# Patient Record
(demographics unavailable — no encounter records)

---

## 2024-12-16 NOTE — PHYSICAL EXAM
[No Acute Distress] : no acute distress [Normal Appearance] : normal appearance [Supple] : supple [Thyroid Not Enlarged] : thyroid not enlarged [No JVD] : no jvd [Murmur ___ / 6] : murmur [unfilled] / 6 [Normal S1, S2] : normal s1, s2 [Normal to Percussion] : normal to percussion [No Abnormalities] : no abnormalities [Benign] : benign [Not Tender] : not tender [No HSM] : no hsm [No Clubbing] : no clubbing [No Cyanosis] : no cyanosis [No Focal Deficits] : no focal deficits [Oriented x3] : oriented x3 [General Appearance - Well Developed] : well developed [General Appearance - Well Nourished] : well nourished [Neck Appearance] : the appearance of the neck was normal [Neck Cervical Mass (___cm)] : no neck mass was observed [Jugular Venous Distention Increased] : there was no jugular-venous distention [Thyroid Diffuse Enlargement] : the thyroid was not enlarged [Thyroid Nodule] : there were no palpable thyroid nodules [Heart Rate And Rhythm] : heart rate and rhythm were normal [Heart Sounds] : normal S1 and S2 [Abnormal Walk] : normal gait [Nail Clubbing] : no clubbing of the fingernails [Cyanosis, Localized] : no localized cyanosis [Skin Color & Pigmentation] : normal skin color and pigmentation [] : no rash [No Venous Stasis] : no venous stasis [Skin Lesions] : no skin lesions [No Skin Ulcers] : no skin ulcer [No Xanthoma] : no  xanthoma was observed [Oriented To Time, Place, And Person] : oriented to person, place, and time [Impaired Insight] : insight and judgment were intact [TextBox_44] : Bilat bruits vs. transmitted murmur [TextBox_68] : Few crackles right base.  [TextBox_105] : Trace edema and chronic stasis changes left.  [FreeTextEntry1] : Pulses diminished. [FreeTextEntry2] : trace bilat edema

## 2024-12-16 NOTE — DISCUSSION/SUMMARY
[FreeTextEntry1] : CAD s/p stent. Carotid Disease PVD and chronic statis changes.  DM  HTN relatively stable. Hyperlipidemia on treatment. Statin increased. Continue present therapy HARJIT doing well on CPAP but having difficulty with mask.  Reviewed mask options.

## 2024-12-16 NOTE — HISTORY OF PRESENT ILLNESS
[TextBox_4] : Colonoscopy N/a Optho  Y Derm Y seeing Dr Odell, cardio recently had carotid tests and was told to call the office this week might have something on left carotid because was done twice did PT for balance seeing neuro for balance already got flu shot has venous insufficiency and seeing vascular now on Repatha and aches and pains are much improved using cpap nightly without issues. got a new machine 5-7uses [facility] Feeling well. Seeing vascular had recent study.   up to date on all vaccinations

## 2025-04-14 NOTE — ASSESSMENT
[FreeTextEntry1] : Continue ADA Diet.  Urged compliance. Patient compliant to CPAP therapy and having positive clinical response to treatment. increased to 6- 9mm Medications reviewed and renewed. Seeing cardiology. and vascular Will refer for internal medicine. Follow-up in 3 months or sooner on a as needed basis.

## 2025-04-14 NOTE — HISTORY OF PRESENT ILLNESS
[TextBox_4] : Here for f/u no issues this winter seeing cardio for carotids, had MRI and then CT scan at [facility] for surgery on Plavix and baby asa. Seeing neurology and cardiology. Feeling generally well. Seeing cardiology. Add labs.